# Patient Record
Sex: FEMALE | Race: WHITE | NOT HISPANIC OR LATINO | Employment: FULL TIME | URBAN - METROPOLITAN AREA
[De-identification: names, ages, dates, MRNs, and addresses within clinical notes are randomized per-mention and may not be internally consistent; named-entity substitution may affect disease eponyms.]

---

## 2023-02-11 PROBLEM — D75.839 THROMBOCYTOSIS: Status: ACTIVE | Noted: 2023-02-11

## 2023-02-11 PROBLEM — F32.4 MAJOR DEPRESSIVE DISORDER IN PARTIAL REMISSION (CMS-HCC): Status: ACTIVE | Noted: 2023-02-11

## 2023-02-11 PROBLEM — M85.80 OSTEOPENIA DETERMINED BY X-RAY: Status: ACTIVE | Noted: 2023-02-11

## 2023-02-11 PROBLEM — M24.9 HYPERMOBILITY OF JOINT: Status: ACTIVE | Noted: 2023-02-11

## 2023-02-11 PROBLEM — M25.569 KNEE PAIN, ACUTE: Status: ACTIVE | Noted: 2023-02-11

## 2023-02-11 PROBLEM — F98.8 ATTENTION DEFICIT DISORDER OF ADULT: Status: ACTIVE | Noted: 2023-02-11

## 2023-02-11 PROBLEM — N92.6 MENSTRUAL PROBLEM: Status: ACTIVE | Noted: 2023-02-11

## 2023-02-11 PROBLEM — R63.5 WEIGHT GAIN: Status: ACTIVE | Noted: 2023-02-11

## 2023-02-11 PROBLEM — R60.0 BILATERAL LEG EDEMA: Status: ACTIVE | Noted: 2023-02-11

## 2023-02-11 PROBLEM — R79.89 LOW SERUM VITAMIN D: Status: ACTIVE | Noted: 2023-02-11

## 2023-02-11 PROBLEM — R03.0 ELEVATED BLOOD PRESSURE READING WITHOUT DIAGNOSIS OF HYPERTENSION: Status: ACTIVE | Noted: 2023-02-11

## 2023-02-11 RX ORDER — ESCITALOPRAM OXALATE 20 MG/1
1 TABLET ORAL DAILY
COMMUNITY
Start: 2019-12-26 | End: 2023-05-05

## 2023-02-11 RX ORDER — DEXTROAMPHETAMINE SACCHARATE, AMPHETAMINE ASPARTATE MONOHYDRATE, DEXTROAMPHETAMINE SULFATE AND AMPHETAMINE SULFATE 2.5; 2.5; 2.5; 2.5 MG/1; MG/1; MG/1; MG/1
10 CAPSULE, EXTENDED RELEASE ORAL EVERY MORNING
COMMUNITY

## 2023-02-11 RX ORDER — NORETHINDRONE ACETATE AND ETHINYL ESTRADIOL 1MG-20(21)
1 KIT ORAL DAILY
COMMUNITY
Start: 2021-06-07

## 2023-02-11 RX ORDER — ERGOCALCIFEROL 1.25 MG/1
50000 CAPSULE ORAL
COMMUNITY
Start: 2022-12-05 | End: 2023-05-05 | Stop reason: SDUPTHER

## 2023-02-11 RX ORDER — HYDROCHLOROTHIAZIDE 12.5 MG/1
12.5 CAPSULE ORAL DAILY PRN
COMMUNITY
Start: 2021-04-30 | End: 2023-05-05 | Stop reason: SDUPTHER

## 2023-03-09 ENCOUNTER — OFFICE VISIT (OUTPATIENT)
Dept: PRIMARY CARE | Facility: CLINIC | Age: 28
End: 2023-03-09
Payer: COMMERCIAL

## 2023-03-09 VITALS
SYSTOLIC BLOOD PRESSURE: 142 MMHG | DIASTOLIC BLOOD PRESSURE: 92 MMHG | WEIGHT: 180 LBS | HEIGHT: 64 IN | BODY MASS INDEX: 30.73 KG/M2

## 2023-03-09 DIAGNOSIS — F98.8 ATTENTION DEFICIT DISORDER OF ADULT: Primary | ICD-10-CM

## 2023-03-09 PROCEDURE — 99213 OFFICE O/P EST LOW 20 MIN: CPT | Performed by: INTERNAL MEDICINE

## 2023-03-09 NOTE — PROGRESS NOTES
"Subjective   Nazanin Junior is a 27 y.o. female who presents for Hypertension.  [unfilled]  She is here for follow up on adderall  She started it two weeks ago (02/23/23)   Feeling better focusing  She is not having insomnia  She wishes to remain on this med at the current dose      Review of Systems  No chest pain  No palpitations  No headaches    Objective   BP (!) 142/92   Ht 1.626 m (5' 4\")   Wt 81.6 kg (180 lb)   BMI 30.90 kg/m²    Physical Exam  Cardiovascular:      Rate and Rhythm: Normal rate and regular rhythm.      Heart sounds: Normal heart sounds.   Pulmonary:      Effort: Pulmonary effort is normal.      Breath sounds: Normal breath sounds.   Abdominal:      Palpations: Abdomen is soft.   Neurological:      Mental Status: She is alert.   Psychiatric:         Mood and Affect: Mood normal.         Thought Content: Thought content normal.         Judgment: Judgment normal.         Assessment/Plan   Problem List Items Addressed This Visit    None         "

## 2023-03-09 NOTE — PATIENT INSTRUCTIONS
See me again in 3 mo.  Please find out from your insurance  if you can get a 90 day supply of the adderall.

## 2023-03-16 PROBLEM — F41.8 DEPRESSION WITH ANXIETY: Status: ACTIVE | Noted: 2023-03-16

## 2023-04-25 ENCOUNTER — TELEPHONE (OUTPATIENT)
Dept: PRIMARY CARE | Facility: CLINIC | Age: 28
End: 2023-04-25
Payer: COMMERCIAL

## 2023-04-28 DIAGNOSIS — F41.8 OTHER SPECIFIED ANXIETY DISORDERS: ICD-10-CM

## 2023-04-28 DIAGNOSIS — R03.0 ELEVATED BLOOD PRESSURE READING IN OFFICE WITHOUT DIAGNOSIS OF HYPERTENSION: Primary | ICD-10-CM

## 2023-04-28 DIAGNOSIS — R79.89 LOW SERUM VITAMIN D: ICD-10-CM

## 2023-05-05 RX ORDER — ERGOCALCIFEROL 1.25 MG/1
50000 CAPSULE ORAL
Qty: 4 CAPSULE | Refills: 5 | Status: SHIPPED | OUTPATIENT
Start: 2023-05-05 | End: 2023-06-04

## 2023-05-05 RX ORDER — ESCITALOPRAM OXALATE 20 MG/1
TABLET ORAL
Qty: 90 TABLET | Refills: 0 | Status: SHIPPED | OUTPATIENT
Start: 2023-05-05 | End: 2024-02-26

## 2023-05-05 RX ORDER — HYDROCHLOROTHIAZIDE 12.5 MG/1
12.5 CAPSULE ORAL DAILY PRN
Qty: 90 CAPSULE | Refills: 0 | Status: SHIPPED | OUTPATIENT
Start: 2023-05-05 | End: 2023-08-02

## 2023-06-08 ENCOUNTER — APPOINTMENT (OUTPATIENT)
Dept: PRIMARY CARE | Facility: CLINIC | Age: 28
End: 2023-06-08
Payer: COMMERCIAL

## 2023-06-22 ENCOUNTER — APPOINTMENT (OUTPATIENT)
Dept: PRIMARY CARE | Facility: CLINIC | Age: 28
End: 2023-06-22
Payer: COMMERCIAL

## 2023-07-14 LAB
ALANINE AMINOTRANSFERASE (SGPT) (U/L) IN SER/PLAS: 15 U/L (ref 7–45)
ALBUMIN (G/DL) IN SER/PLAS: 4.2 G/DL (ref 3.4–5)
ALKALINE PHOSPHATASE (U/L) IN SER/PLAS: 65 U/L (ref 33–110)
ANION GAP IN SER/PLAS: 12 MMOL/L (ref 10–20)
APPEARANCE, URINE: ABNORMAL
ASPARTATE AMINOTRANSFERASE (SGOT) (U/L) IN SER/PLAS: 14 U/L (ref 9–39)
BACTERIA, URINE: ABNORMAL /HPF
BASOPHILS (10*3/UL) IN BLOOD BY AUTOMATED COUNT: 0.08 X10E9/L (ref 0–0.1)
BASOPHILS/100 LEUKOCYTES IN BLOOD BY AUTOMATED COUNT: 1 % (ref 0–2)
BILIRUBIN TOTAL (MG/DL) IN SER/PLAS: 0.3 MG/DL (ref 0–1.2)
BILIRUBIN, URINE: NEGATIVE
BLOOD, URINE: NEGATIVE
CALCIDIOL (25 OH VITAMIN D3) (NG/ML) IN SER/PLAS: 56 NG/ML
CALCIUM (MG/DL) IN SER/PLAS: 9.7 MG/DL (ref 8.6–10.3)
CALCIUM OXALATE CRYSTALS, URINE: ABNORMAL /HPF
CARBON DIOXIDE, TOTAL (MMOL/L) IN SER/PLAS: 27 MMOL/L (ref 21–32)
CHLORIDE (MMOL/L) IN SER/PLAS: 104 MMOL/L (ref 98–107)
CHOLESTEROL (MG/DL) IN SER/PLAS: 207 MG/DL (ref 0–199)
CHOLESTEROL IN HDL (MG/DL) IN SER/PLAS: 61.7 MG/DL
CHOLESTEROL/HDL RATIO: 3.4
COBALAMIN (VITAMIN B12) (PG/ML) IN SER/PLAS: 182 PG/ML (ref 211–911)
COLOR, URINE: YELLOW
CREATININE (MG/DL) IN SER/PLAS: 0.82 MG/DL (ref 0.5–1.05)
EOSINOPHILS (10*3/UL) IN BLOOD BY AUTOMATED COUNT: 0.3 X10E9/L (ref 0–0.7)
EOSINOPHILS/100 LEUKOCYTES IN BLOOD BY AUTOMATED COUNT: 3.9 % (ref 0–6)
ERYTHROCYTE DISTRIBUTION WIDTH (RATIO) BY AUTOMATED COUNT: 13 % (ref 11.5–14.5)
ERYTHROCYTE MEAN CORPUSCULAR HEMOGLOBIN CONCENTRATION (G/DL) BY AUTOMATED: 33.2 G/DL (ref 32–36)
ERYTHROCYTE MEAN CORPUSCULAR VOLUME (FL) BY AUTOMATED COUNT: 91 FL (ref 80–100)
ERYTHROCYTES (10*6/UL) IN BLOOD BY AUTOMATED COUNT: 4.72 X10E12/L (ref 4–5.2)
GFR FEMALE: >90 ML/MIN/1.73M2
GLUCOSE (MG/DL) IN SER/PLAS: 79 MG/DL (ref 74–99)
GLUCOSE, URINE: NEGATIVE MG/DL
HEMATOCRIT (%) IN BLOOD BY AUTOMATED COUNT: 43.1 % (ref 36–46)
HEMOGLOBIN (G/DL) IN BLOOD: 14.3 G/DL (ref 12–16)
IMMATURE GRANULOCYTES/100 LEUKOCYTES IN BLOOD BY AUTOMATED COUNT: 0.3 % (ref 0–0.9)
IRON (UG/DL) IN SER/PLAS: 96 UG/DL (ref 35–150)
IRON BINDING CAPACITY (UG/DL) IN SER/PLAS: 395 UG/DL (ref 240–445)
IRON SATURATION (%) IN SER/PLAS: 24 % (ref 25–45)
KETONES, URINE: NEGATIVE MG/DL
LDL: 129 MG/DL (ref 0–99)
LEUKOCYTE ESTERASE, URINE: ABNORMAL
LEUKOCYTES (10*3/UL) IN BLOOD BY AUTOMATED COUNT: 7.7 X10E9/L (ref 4.4–11.3)
LYMPHOCYTES (10*3/UL) IN BLOOD BY AUTOMATED COUNT: 3.12 X10E9/L (ref 1.2–4.8)
LYMPHOCYTES/100 LEUKOCYTES IN BLOOD BY AUTOMATED COUNT: 40.5 % (ref 13–44)
MONOCYTES (10*3/UL) IN BLOOD BY AUTOMATED COUNT: 0.66 X10E9/L (ref 0.1–1)
MONOCYTES/100 LEUKOCYTES IN BLOOD BY AUTOMATED COUNT: 8.6 % (ref 2–10)
MUCUS, URINE: ABNORMAL /LPF
NEUTROPHILS (10*3/UL) IN BLOOD BY AUTOMATED COUNT: 3.53 X10E9/L (ref 1.2–7.7)
NEUTROPHILS/100 LEUKOCYTES IN BLOOD BY AUTOMATED COUNT: 45.7 % (ref 40–80)
NITRITE, URINE: NEGATIVE
PH, URINE: 7 (ref 5–8)
PLATELETS (10*3/UL) IN BLOOD AUTOMATED COUNT: 518 X10E9/L (ref 150–450)
POTASSIUM (MMOL/L) IN SER/PLAS: 4.4 MMOL/L (ref 3.5–5.3)
PROTEIN TOTAL: 7 G/DL (ref 6.4–8.2)
PROTEIN, URINE: NEGATIVE MG/DL
RBC, URINE: 1 /HPF (ref 0–5)
SODIUM (MMOL/L) IN SER/PLAS: 139 MMOL/L (ref 136–145)
SPECIFIC GRAVITY, URINE: 1.02 (ref 1–1.03)
SQUAMOUS EPITHELIAL CELLS, URINE: 1 /HPF
THYROTROPIN (MIU/L) IN SER/PLAS BY DETECTION LIMIT <= 0.05 MIU/L: 1.68 MIU/L (ref 0.44–3.98)
TRIGLYCERIDE (MG/DL) IN SER/PLAS: 84 MG/DL (ref 0–149)
UREA NITROGEN (MG/DL) IN SER/PLAS: 9 MG/DL (ref 6–23)
UROBILINOGEN, URINE: <2 MG/DL (ref 0–1.9)
VLDL: 17 MG/DL (ref 0–40)
WBC CLUMPS, URINE: ABNORMAL /HPF
WBC, URINE: 7 /HPF (ref 0–5)

## 2023-07-30 DIAGNOSIS — R03.0 ELEVATED BLOOD PRESSURE READING IN OFFICE WITHOUT DIAGNOSIS OF HYPERTENSION: ICD-10-CM

## 2023-08-02 RX ORDER — HYDROCHLOROTHIAZIDE 12.5 MG/1
12.5 CAPSULE ORAL DAILY PRN
Qty: 90 CAPSULE | Refills: 0 | Status: SHIPPED | OUTPATIENT
Start: 2023-08-02 | End: 2024-02-05

## 2023-11-09 NOTE — PROGRESS NOTES
Teresa, I sent you one message. Please don't call her with it. I decided I will send her a My Chart note instead.

## 2023-11-20 DIAGNOSIS — E53.8 VITAMIN B12 DEFICIENCY: ICD-10-CM

## 2023-11-20 DIAGNOSIS — M85.80 OSTEOPENIA DETERMINED BY X-RAY: Primary | ICD-10-CM

## 2023-11-20 DIAGNOSIS — R79.89 LOW SERUM VITAMIN D: ICD-10-CM

## 2023-11-20 DIAGNOSIS — E53.8 FOLATE DEFICIENCY: ICD-10-CM

## 2023-11-20 RX ORDER — ERGOCALCIFEROL 1.25 MG/1
50000 CAPSULE ORAL
Qty: 12 CAPSULE | Refills: 1 | Status: SHIPPED | OUTPATIENT
Start: 2023-11-20 | End: 2024-04-17 | Stop reason: SDUPTHER

## 2023-11-20 RX ORDER — ERGOCALCIFEROL 1.25 MG/1
50000 CAPSULE ORAL
COMMUNITY
End: 2023-11-20 | Stop reason: SDUPTHER

## 2023-11-30 ENCOUNTER — LAB (OUTPATIENT)
Dept: LAB | Facility: LAB | Age: 28
End: 2023-11-30
Payer: COMMERCIAL

## 2023-11-30 DIAGNOSIS — E53.8 VITAMIN B12 DEFICIENCY: ICD-10-CM

## 2023-11-30 DIAGNOSIS — M85.80 OSTEOPENIA DETERMINED BY X-RAY: ICD-10-CM

## 2023-11-30 DIAGNOSIS — R79.89 LOW SERUM VITAMIN D: ICD-10-CM

## 2023-11-30 DIAGNOSIS — E53.8 FOLATE DEFICIENCY: ICD-10-CM

## 2023-11-30 PROCEDURE — 82746 ASSAY OF FOLIC ACID SERUM: CPT

## 2023-11-30 PROCEDURE — 82306 VITAMIN D 25 HYDROXY: CPT

## 2023-11-30 PROCEDURE — 82607 VITAMIN B-12: CPT

## 2023-11-30 PROCEDURE — 36415 COLL VENOUS BLD VENIPUNCTURE: CPT

## 2023-12-01 LAB
25(OH)D3 SERPL-MCNC: 80 NG/ML (ref 30–100)
FOLATE SERPL-MCNC: 23.1 NG/ML
VIT B12 SERPL-MCNC: 253 PG/ML (ref 211–911)

## 2023-12-22 ENCOUNTER — ANCILLARY PROCEDURE (OUTPATIENT)
Dept: RADIOLOGY | Facility: CLINIC | Age: 28
End: 2023-12-22
Payer: COMMERCIAL

## 2023-12-22 DIAGNOSIS — M85.80 OTHER SPECIFIED DISORDERS OF BONE DENSITY AND STRUCTURE, UNSPECIFIED SITE: ICD-10-CM

## 2023-12-22 PROCEDURE — 77080 DXA BONE DENSITY AXIAL: CPT

## 2023-12-22 PROCEDURE — 77080 DXA BONE DENSITY AXIAL: CPT | Performed by: RADIOLOGY

## 2023-12-27 ENCOUNTER — TELEPHONE (OUTPATIENT)
Dept: RHEUMATOLOGY | Facility: CLINIC | Age: 28
End: 2023-12-27
Payer: COMMERCIAL

## 2023-12-27 NOTE — TELEPHONE ENCOUNTER
----- Message from Fabby Marie DO sent at 12/26/2023 12:53 PM EST -----  I just received her bone density, please see if I saw her this year, if not she need a follow up appointment  ----- Message -----  From: Interface, Radiology Results In  Sent: 12/22/2023   6:16 PM EST  To: Fabby Marie DO

## 2024-02-05 ENCOUNTER — LAB (OUTPATIENT)
Dept: LAB | Facility: LAB | Age: 29
End: 2024-02-05
Payer: COMMERCIAL

## 2024-02-05 ENCOUNTER — OFFICE VISIT (OUTPATIENT)
Dept: RHEUMATOLOGY | Facility: CLINIC | Age: 29
End: 2024-02-05
Payer: COMMERCIAL

## 2024-02-05 VITALS
HEART RATE: 82 BPM | WEIGHT: 207 LBS | SYSTOLIC BLOOD PRESSURE: 126 MMHG | DIASTOLIC BLOOD PRESSURE: 88 MMHG | HEIGHT: 64 IN | BODY MASS INDEX: 35.34 KG/M2

## 2024-02-05 DIAGNOSIS — M85.80 LOW BONE MASS: ICD-10-CM

## 2024-02-05 DIAGNOSIS — M85.80 LOW BONE MASS: Primary | ICD-10-CM

## 2024-02-05 PROCEDURE — 99214 OFFICE O/P EST MOD 30 MIN: CPT | Performed by: INTERNAL MEDICINE

## 2024-02-05 PROCEDURE — 36415 COLL VENOUS BLD VENIPUNCTURE: CPT

## 2024-02-05 PROCEDURE — 82565 ASSAY OF CREATININE: CPT

## 2024-02-05 RX ORDER — ALENDRONATE SODIUM 70 MG/1
70 TABLET ORAL
Qty: 4 TABLET | Refills: 1 | Status: SHIPPED | OUTPATIENT
Start: 2024-02-05 | End: 2024-04-29 | Stop reason: SDUPTHER

## 2024-02-05 NOTE — PROGRESS NOTES
Follow-up Rheumatology Patient Visit    Chief Complaint:  Nazanin Junior is a 28 y.o. female presenting today for Follow-up.    History of Presenting Problem:   29 y/o female with hx of osteopenia present for evaluation. She denies any history of fragility fracture. Grandmother with history of osteoporosis  She reports history of progressively her joints  She denies use of medications to accelerate bone loss  She has regular menstrual periods   Patient had a repeat bone density and she is here to go over the results and possible treatment    Problem List:   Patient Active Problem List   Diagnosis    Attention deficit disorder of adult    Bilateral leg edema    Low serum vitamin D    Menstrual problem    Osteopenia determined by x-ray    Thrombocytosis    Weight gain    Knee pain, acute    Elevated blood pressure reading without diagnosis of hypertension    Hypermobility of joint    Major depressive disorder in partial remission (CMS/HCC)    Depression with anxiety       Past Medical History: No past medical history on file.    Surgical History: No past surgical history on file.     Allergies: No Known Allergies    Medications:   Current Outpatient Medications:     ergocalciferol (Vitamin D-2) 1.25 MG (39511 UT) capsule, Take 1 capsule (50,000 Units) by mouth 1 (one) time per week. (Patient taking differently: Take 5,000 Units by mouth 1 (one) time per week.), Disp: 12 capsule, Rfl: 1    escitalopram (Lexapro) 20 mg tablet, TAKE 1 TABLET BY MOUTH EVERY DAY, Disp: 90 tablet, Rfl: 0    hydroCHLOROthiazide (Microzide) 12.5 mg capsule, TAKE 1 CAPSULE (12.5 MG) BY MOUTH ONCE DAILY AS NEEDED (SWELLING)., Disp: 90 capsule, Rfl: 0    norethindrone-e.estradioL-iron (Junel FE 1/20) 1 mg-20 mcg (21)/75 mg (7) tablet, Take 1 tablet by mouth once daily., Disp: , Rfl:     alendronate (Fosamax) 70 mg tablet, Take 1 tablet (70 mg) by mouth every 7 days. Take in morning with full glass of water on an empty stomach. No food, drink,  "or meds for 30 minutes after. Do not lie down until after first meal of day (to avoid throat irritation)., Disp: 4 tablet, Rfl: 1    amphetamine-dextroamphetamine XR (Adderall XR) 10 mg 24 hr capsule, Take 1 capsule (10 mg) by mouth once daily in the morning. Do not crush or chew., Disp: , Rfl:       Objective   Physical Examination:    Visit Vitals  /88   Pulse 82   Ht 1.626 m (5' 4\")   Wt 93.9 kg (207 lb)   BMI 35.53 kg/m²   Smoking Status Never   BSA 2.06 m²        Gen: NAD, A&O x 3  HEENT: clear sclera and conjunctiva,     Musculoskeletal:   Neck; WNL, full ROM  Shoulder: WNL, full ROM  Elbow:WNL, full ROM, no effusion noted  Wrist and fingers;no active synovitis noted, Full ROM in the Wrist , Good fist and   Knees:  No effusions or crepitation, full ROM.  Hips; WNL, full ROM, Negative Juan José test  Ankle, Feet; WNL, full ROM    Skin: No rashes or lesions seen, no nail changes  Neuro: A&O x3, Normal Gait    Procedures :None    Orders:  Orders Placed This Encounter   Procedures    Creatinine        Provider Impression:   Assessment/Plan   Encounter Diagnosis   Name Primary?    Low bone mass Yes        26 y/o female with hx of osteopenia present for evaluation. However score is noted to be -1.6 in the left lateral neck. She has not had any known fractures. Her PCP did order a work-up for secondary causes of osteoporosis and are negative.  Repeat bone density shows that her T-score has worsened in the left Hip at -1.7  -After discussing options recommended patient started on bisphosphonate with her bone loss.  Start alendronate 70 mg weekly  -The patient should maintain intake of supplementation of calcium 1200 mg in divided doses and vitamin D 2000 units,  -Avoid activity that puts her at risk for fracture  -Engagement in regular weight bearing and muscle strengthening exercises as tolerated.   -Excessive caffeine use and alcohol use should be avoided.  -Patient should have a repeat BMD testing in Dec " 2025   -Follow-up in 2 months

## 2024-02-06 LAB
CREAT SERPL-MCNC: 0.84 MG/DL (ref 0.5–1.05)
EGFRCR SERPLBLD CKD-EPI 2021: >90 ML/MIN/1.73M*2

## 2024-02-13 ENCOUNTER — TELEPHONE (OUTPATIENT)
Dept: RHEUMATOLOGY | Facility: CLINIC | Age: 29
End: 2024-02-13
Payer: COMMERCIAL

## 2024-02-24 DIAGNOSIS — F41.8 OTHER SPECIFIED ANXIETY DISORDERS: ICD-10-CM

## 2024-02-26 RX ORDER — ESCITALOPRAM OXALATE 20 MG/1
TABLET ORAL
Qty: 90 TABLET | Refills: 0 | Status: SHIPPED | OUTPATIENT
Start: 2024-02-26

## 2024-03-27 ENCOUNTER — APPOINTMENT (OUTPATIENT)
Dept: OTOLARYNGOLOGY | Facility: CLINIC | Age: 29
End: 2024-03-27
Payer: COMMERCIAL

## 2024-04-01 DIAGNOSIS — M85.80 LOW BONE MASS: ICD-10-CM

## 2024-04-03 ENCOUNTER — DOCUMENTATION (OUTPATIENT)
Dept: RHEUMATOLOGY | Facility: CLINIC | Age: 29
End: 2024-04-03
Payer: COMMERCIAL

## 2024-04-03 NOTE — PROGRESS NOTES
Tried to contact patient by phone to schedule 2 month follow up for April, number does not seem to be a working number. Also tried to send patient a message via [employer] for her to call and schedule. This looks like the 2nd attempt that we have tried to reach out to patient to schedule.

## 2024-04-04 RX ORDER — ALENDRONATE SODIUM 70 MG/1
70 TABLET ORAL
Qty: 4 TABLET | Refills: 1 | OUTPATIENT
Start: 2024-04-04 | End: 2024-06-03

## 2024-04-17 DIAGNOSIS — R79.89 LOW SERUM VITAMIN D: ICD-10-CM

## 2024-04-17 DIAGNOSIS — M85.80 OSTEOPENIA DETERMINED BY X-RAY: ICD-10-CM

## 2024-04-17 RX ORDER — ERGOCALCIFEROL 1.25 MG/1
50000 CAPSULE ORAL
Qty: 12 CAPSULE | Refills: 1 | Status: SHIPPED | OUTPATIENT
Start: 2024-04-21

## 2024-04-29 ENCOUNTER — OFFICE VISIT (OUTPATIENT)
Dept: RHEUMATOLOGY | Facility: CLINIC | Age: 29
End: 2024-04-29
Payer: COMMERCIAL

## 2024-04-29 ENCOUNTER — LAB (OUTPATIENT)
Dept: LAB | Facility: LAB | Age: 29
End: 2024-04-29
Payer: COMMERCIAL

## 2024-04-29 ENCOUNTER — APPOINTMENT (OUTPATIENT)
Dept: RHEUMATOLOGY | Facility: CLINIC | Age: 29
End: 2024-04-29
Payer: COMMERCIAL

## 2024-04-29 VITALS
BODY MASS INDEX: 35.19 KG/M2 | HEART RATE: 96 BPM | WEIGHT: 205 LBS | DIASTOLIC BLOOD PRESSURE: 90 MMHG | SYSTOLIC BLOOD PRESSURE: 141 MMHG

## 2024-04-29 DIAGNOSIS — M85.80 LOW BONE MASS: ICD-10-CM

## 2024-04-29 PROCEDURE — 80048 BASIC METABOLIC PNL TOTAL CA: CPT

## 2024-04-29 PROCEDURE — 36415 COLL VENOUS BLD VENIPUNCTURE: CPT

## 2024-04-29 PROCEDURE — 99213 OFFICE O/P EST LOW 20 MIN: CPT | Performed by: INTERNAL MEDICINE

## 2024-04-29 RX ORDER — ALENDRONATE SODIUM 70 MG/1
70 TABLET ORAL
Qty: 12 TABLET | Refills: 2 | Status: SHIPPED | OUTPATIENT
Start: 2024-04-29 | End: 2024-07-28

## 2024-04-29 NOTE — PROGRESS NOTES
Follow-up Rheumatology Patient Visit    Chief Complaint:  Nazanin Junior is a 28 y.o. female presenting today for Med Management.    History of Presenting Problem:   29 y/o female with hx of osteopenia present for evaluation. She denies any history of fragility fracture. Grandmother with history of osteoporosis  She reports history of progressively her joints  She denies use of medications to accelerate bone loss  She has regular menstrual periods   Today patient reports she is tolerating Fosamax, the first 2 weeks she had a lot of achiness but that dissipated.    Problem List:   Patient Active Problem List   Diagnosis    Attention deficit disorder of adult    Bilateral leg edema    Low serum vitamin D    Menstrual problem    Osteopenia determined by x-ray    Thrombocytosis    Weight gain    Knee pain, acute    Elevated blood pressure reading without diagnosis of hypertension    Hypermobility of joint    Major depressive disorder in partial remission (CMS-HCC)    Depression with anxiety       Past Medical History: No past medical history on file.    Surgical History: No past surgical history on file.     Allergies: No Known Allergies    Medications:   Current Outpatient Medications:     amphetamine-dextroamphetamine XR (Adderall XR) 10 mg 24 hr capsule, Take 1 capsule (10 mg) by mouth once daily in the morning. Do not crush or chew., Disp: , Rfl:     ergocalciferol (Vitamin D-2) 1.25 MG (52127 UT) capsule, Take 1 capsule (50,000 Units) by mouth 1 (one) time per week., Disp: 12 capsule, Rfl: 1    escitalopram (Lexapro) 20 mg tablet, TAKE 1 TABLET BY MOUTH EVERY DAY, Disp: 90 tablet, Rfl: 0    norethindrone-e.estradioL-iron (Junel FE 1/20) 1 mg-20 mcg (21)/75 mg (7) tablet, Take 1 tablet by mouth once daily., Disp: , Rfl:     alendronate (Fosamax) 70 mg tablet, Take 1 tablet (70 mg) by mouth every 7 days. Take in morning with full glass of water on an empty stomach. No food, drink, or meds for 30 minutes after. Do  not lie down until after first meal of day (to avoid throat irritation)., Disp: 12 tablet, Rfl: 2    hydroCHLOROthiazide (Microzide) 12.5 mg capsule, TAKE 1 CAPSULE (12.5 MG) BY MOUTH ONCE DAILY AS NEEDED (SWELLING)., Disp: 90 capsule, Rfl: 0      Objective   Physical Examination:    Visit Vitals  /90   Pulse 96   Wt 93 kg (205 lb)   BMI 35.19 kg/m²   Smoking Status Never   BSA 2.05 m²        Gen: NAD, A&O x 3  HEENT: clear sclera and conjunctiva,     Musculoskeletal:   Neck; WNL, full ROM  Shoulder: WNL, full ROM  Elbow:WNL, full ROM, no effusion noted  Wrist and fingers;no active synovitis noted, Full ROM in the Wrist , Good fist and   Knees:  No effusions or crepitation, full ROM.  Hips; WNL, full ROM, Negative Juan José test  Ankle, Feet; WNL, full ROM    Skin: No rashes or lesions seen, no nail changes  Neuro: A&O x3, Normal Gait    Procedures :None    Orders:  Orders Placed This Encounter   Procedures    Basic Metabolic Panel        Provider Impression:   Assessment/Plan   Encounter Diagnosis   Name Primary?    Low bone mass           29 y/o female with hx of osteopenia present for evaluation. However score is noted to be -1.6 in the left lateral neck. She has not had any known fractures. Her PCP did order a work-up for secondary causes of osteoporosis and are negative.  Repeat bone density shows that her T-score has worsened in the left Hip at -1.7  -Continue with alendronate 70 mg weekly  -The patient should maintain intake of supplementation of calcium 1200 mg in divided doses and vitamin D 2000 units,  -Avoid activity that puts her at risk for fracture  -Engagement in regular weight bearing and muscle strengthening exercises as tolerated.   -Excessive caffeine use and alcohol use should be avoided.  -Patient should have a repeat BMD testing in Dec 2025   -Follow-up in 9 months

## 2024-04-30 LAB
ANION GAP SERPL CALC-SCNC: 16 MMOL/L (ref 10–20)
BUN SERPL-MCNC: 9 MG/DL (ref 6–23)
CALCIUM SERPL-MCNC: 9.4 MG/DL (ref 8.6–10.6)
CHLORIDE SERPL-SCNC: 104 MMOL/L (ref 98–107)
CO2 SERPL-SCNC: 23 MMOL/L (ref 21–32)
CREAT SERPL-MCNC: 0.68 MG/DL (ref 0.5–1.05)
EGFRCR SERPLBLD CKD-EPI 2021: >90 ML/MIN/1.73M*2
GLUCOSE SERPL-MCNC: 96 MG/DL (ref 74–99)
POTASSIUM SERPL-SCNC: 4.5 MMOL/L (ref 3.5–5.3)
SODIUM SERPL-SCNC: 138 MMOL/L (ref 136–145)

## 2024-07-10 DIAGNOSIS — N92.6 MENSTRUAL PROBLEM: ICD-10-CM

## 2024-07-10 RX ORDER — NORETHINDRONE ACETATE AND ETHINYL ESTRADIOL 1MG-20(21)
1 KIT ORAL DAILY
Qty: 28 TABLET | Refills: 12 | Status: SHIPPED | OUTPATIENT
Start: 2024-07-10

## 2024-07-31 ENCOUNTER — APPOINTMENT (OUTPATIENT)
Dept: OTOLARYNGOLOGY | Facility: CLINIC | Age: 29
End: 2024-07-31
Payer: COMMERCIAL

## 2024-07-31 ENCOUNTER — APPOINTMENT (OUTPATIENT)
Dept: AUDIOLOGY | Facility: CLINIC | Age: 29
End: 2024-07-31
Payer: COMMERCIAL

## 2024-07-31 VITALS — BODY MASS INDEX: 35 KG/M2 | WEIGHT: 205 LBS | HEIGHT: 64 IN

## 2024-07-31 DIAGNOSIS — H61.23 BILATERAL IMPACTED CERUMEN: ICD-10-CM

## 2024-07-31 DIAGNOSIS — H90.0 CONDUCTIVE HEARING LOSS, BILATERAL: Primary | ICD-10-CM

## 2024-07-31 PROBLEM — R93.7 X-RAY EVIDENCE OF POOR BONE MINERALIZATION: Status: ACTIVE | Noted: 2024-07-31

## 2024-07-31 PROBLEM — N63.20 LEFT BREAST LUMP: Status: ACTIVE | Noted: 2019-12-16

## 2024-07-31 PROBLEM — M85.9 LOW BONE DENSITY: Status: ACTIVE | Noted: 2022-10-31

## 2024-07-31 PROCEDURE — 1036F TOBACCO NON-USER: CPT | Performed by: STUDENT IN AN ORGANIZED HEALTH CARE EDUCATION/TRAINING PROGRAM

## 2024-07-31 PROCEDURE — 3008F BODY MASS INDEX DOCD: CPT | Performed by: STUDENT IN AN ORGANIZED HEALTH CARE EDUCATION/TRAINING PROGRAM

## 2024-07-31 PROCEDURE — 99203 OFFICE O/P NEW LOW 30 MIN: CPT | Performed by: STUDENT IN AN ORGANIZED HEALTH CARE EDUCATION/TRAINING PROGRAM

## 2024-07-31 PROCEDURE — 69210 REMOVE IMPACTED EAR WAX UNI: CPT | Performed by: STUDENT IN AN ORGANIZED HEALTH CARE EDUCATION/TRAINING PROGRAM

## 2024-07-31 NOTE — PROGRESS NOTES
OTOSCOPY      Name:  Nazanin Junior  :  1995  Age:  29 y.o.  Date of Evaluation:  24   Referring Provider:  Dr. Burkett     History:  Ms. Junior was to be seen for audiometric evaluation due to aural fullness and muffled hearing, however patient had concerns for cerumen accumulation    See audiometric evaluation at end of this report or scanned under media tab    OTOSCOPY:       Right Ear: Occluding cerumen       Left Ear: Occluding cerumen    NOTE: Patient walked to ENT for cerumen to be removed. Following cerumen removal, patient noted significant improvement in hearing sensitivity and declined audiometric evaluation.      RECOMMENDATIONS:  -Recommend patient return should concerns for changes in hearing sensitivity arise or as medically indicated.     Klaus Rod, CCC-A     Appt: 1:00 - 1:05 PM

## 2024-07-31 NOTE — PROGRESS NOTES
HPI  Nazanin Junior is a 29 y.o. female presenting for initial evaluation of bilateral cerumen impaction.  The patient reports developing bilateral cerumen buildup for several months affecting her hearing.  Denies ear pain, vertigo, discharge from ear, tinnitus, aural fullness or autophony.   Denies history of prior ear surgery.     History reviewed. No pertinent past medical history.    History reviewed. No pertinent surgical history.      Current Outpatient Medications on File Prior to Visit   Medication Sig Dispense Refill    ergocalciferol (Vitamin D-2) 1.25 MG (17362 UT) capsule Take 1 capsule (50,000 Units) by mouth 1 (one) time per week. 12 capsule 1    escitalopram (Lexapro) 20 mg tablet TAKE 1 TABLET BY MOUTH EVERY DAY 90 tablet 0    norethindrone-e.estradioL-iron (Junel FE 1/20) 1 mg-20 mcg (21)/75 mg (7) tablet Take 1 tablet by mouth once daily. 28 tablet 12    alendronate (Fosamax) 70 mg tablet Take 1 tablet (70 mg) by mouth every 7 days. Take in morning with full glass of water on an empty stomach. No food, drink, or meds for 30 minutes after. Do not lie down until after first meal of day (to avoid throat irritation). 12 tablet 2    hydroCHLOROthiazide (Microzide) 12.5 mg capsule TAKE 1 CAPSULE (12.5 MG) BY MOUTH ONCE DAILY AS NEEDED (SWELLING). 90 capsule 0    [DISCONTINUED] amphetamine-dextroamphetamine XR (Adderall XR) 10 mg 24 hr capsule Take 1 capsule (10 mg) by mouth once daily in the morning. Do not crush or chew.       No current facility-administered medications on file prior to visit.        No Known Allergies     Review of Systems  A detailed 12 point ROS was performed and is negative except as noted in the intake form, HPI and/or Past Medical History        Physical Exam   CONSTITUTIONAL: Well developed, well nourished.  VOICE: Normal voice quality  RESPIRATION: Breathing comfortably, no stridor.  CV: No clubbing/cyanosis/edema in hands.  EYES: EOM Intact, sclera normal.  NEURO: Alert and  oriented times 3, Cranial nerves V,VII intact and symmetric bilaterally.  HEAD AND FACE: Symmetric facial features, no masses or lesions, sinuses nontender to palpation.  SALIVARY GLANDS: Parotid and submandibular glands normal bilaterally.  + EARS: Normal external ears  Bilateral EACs with cerumen impaction (removed/see procedure note)  Right EAC patent, tympanic membrane intact  Left EAC patent, tympanic membrane intact   NOSE: External nose midline, anterior rhinoscopy is normal with limited visualization to the anterior aspect of the interior turbinates. No lesions noted.  ORAL CAVITY/OROPHARYNX/LIPS: Normal mucous membranes, normal floor of mouth/tongue/OP, no masses or lesions are noted.  PHARYNGEAL WALLS AND NASOPHARYNX: No masses noted. Mucosa appears clean and moist  NECK/LYMPH: No LAD, no thyroid masses. Trachea palpably midline  SKIN: Neck skin is without injury  PSYCH: Alert and oriented with appropriate mood and affect     Procedure: Removal of impacted cerumen, bilateral  Indication: Cerumen impaction.   The procedure was reviewed and explained.  Verbal consent was obtained.  With the use of the microscope and speculum the right ear was examined, cerumen was cleaned with the use of curettes, irrigation and suction.  Attention was turned to the left ear, with the use of the microscope and speculum the left ear was examined, cerumen was cleaned with the use of curettes, irrigation and suction.  Hearing returned to baseline bilaterally following cerumen removal. The patient tolerated the procedure well.         Assessment  Bilateral cerumen impaction  Bilateral conductive hearing loss    Plan  29-year-old female presenting for evaluation of bilateral cerumen impaction.  Cerumen impaction removed from both EACs.  Preventive measure discussed, hearing returned to baseline following cerumen removal.  RTC PRN

## 2024-08-14 DIAGNOSIS — F41.8 OTHER SPECIFIED ANXIETY DISORDERS: ICD-10-CM

## 2024-08-14 RX ORDER — ESCITALOPRAM OXALATE 20 MG/1
20 TABLET ORAL DAILY
Qty: 90 TABLET | Refills: 0 | OUTPATIENT
Start: 2024-08-14

## 2024-08-14 NOTE — TELEPHONE ENCOUNTER
Please refill    escitalopram (Lexapro) 20 mg tablet   TAKE 1 TABLET BY MOUTH EVERY DAY   [facility] in [place]

## 2024-08-15 ENCOUNTER — PATIENT MESSAGE (OUTPATIENT)
Dept: PRIMARY CARE | Facility: CLINIC | Age: 29
End: 2024-08-15
Payer: COMMERCIAL

## 2024-08-16 ENCOUNTER — TELEPHONE (OUTPATIENT)
Dept: PRIMARY CARE | Facility: CLINIC | Age: 29
End: 2024-08-16
Payer: COMMERCIAL

## 2024-08-16 DIAGNOSIS — F41.8 OTHER SPECIFIED ANXIETY DISORDERS: ICD-10-CM

## 2024-08-16 NOTE — TELEPHONE ENCOUNTER
Patient calling for a refill on her medications but she hasn't been here since 3/2023.     She is scheduled for a physical here on Oct 2.   She says she will need a refill on her anti-depressant before then.     Escitalopram 20 mg Take 1 tablet by mouth every day    [facility] [phone]    Nazanin [phone]

## 2024-08-19 RX ORDER — ESCITALOPRAM OXALATE 20 MG/1
20 TABLET ORAL DAILY
Qty: 90 TABLET | Refills: 0 | Status: SHIPPED | OUTPATIENT
Start: 2024-08-19

## 2024-10-02 ENCOUNTER — APPOINTMENT (OUTPATIENT)
Dept: PRIMARY CARE | Facility: CLINIC | Age: 29
End: 2024-10-02
Payer: COMMERCIAL

## 2024-10-02 VITALS
WEIGHT: 206 LBS | HEIGHT: 64 IN | RESPIRATION RATE: 16 BRPM | DIASTOLIC BLOOD PRESSURE: 84 MMHG | HEART RATE: 80 BPM | SYSTOLIC BLOOD PRESSURE: 120 MMHG | BODY MASS INDEX: 35.17 KG/M2

## 2024-10-02 DIAGNOSIS — M85.80 OSTEOPENIA, UNSPECIFIED LOCATION: ICD-10-CM

## 2024-10-02 DIAGNOSIS — Z51.81 MEDICATION MONITORING ENCOUNTER: Primary | ICD-10-CM

## 2024-10-02 DIAGNOSIS — M85.80 OSTEOPENIA DETERMINED BY X-RAY: ICD-10-CM

## 2024-10-02 DIAGNOSIS — F32.4 MAJOR DEPRESSIVE DISORDER IN PARTIAL REMISSION, UNSPECIFIED WHETHER RECURRENT (CMS-HCC): ICD-10-CM

## 2024-10-02 DIAGNOSIS — R79.89 LOW SERUM VITAMIN D: ICD-10-CM

## 2024-10-02 DIAGNOSIS — E55.9 VITAMIN D DEFICIENCY: ICD-10-CM

## 2024-10-02 DIAGNOSIS — Z00.00 ROUTINE ADULT HEALTH MAINTENANCE: ICD-10-CM

## 2024-10-02 DIAGNOSIS — E78.5 ELEVATED LIPIDS: ICD-10-CM

## 2024-10-02 PROCEDURE — 99395 PREV VISIT EST AGE 18-39: CPT | Performed by: INTERNAL MEDICINE

## 2024-10-02 PROCEDURE — 3008F BODY MASS INDEX DOCD: CPT | Performed by: INTERNAL MEDICINE

## 2024-10-02 RX ORDER — ESCITALOPRAM OXALATE 20 MG/1
20 TABLET ORAL DAILY
Qty: 90 TABLET | Refills: 3 | Status: SHIPPED | OUTPATIENT
Start: 2024-10-02

## 2024-10-02 RX ORDER — ERGOCALCIFEROL 1.25 MG/1
50000 CAPSULE ORAL
Qty: 12 CAPSULE | Refills: 1 | Status: SHIPPED | OUTPATIENT
Start: 2024-10-06

## 2024-10-02 ASSESSMENT — ENCOUNTER SYMPTOMS
PALPITATIONS: 0
HEADACHES: 1
ROS GI COMMENTS: DENIES HEARTBURN
DYSURIA: 0
DIARRHEA: 0
COUGH: 0
BACK PAIN: 0
SHORTNESS OF BREATH: 0
CONSTIPATION: 0

## 2024-10-02 ASSESSMENT — PATIENT HEALTH QUESTIONNAIRE - PHQ9
SUM OF ALL RESPONSES TO PHQ9 QUESTIONS 1 AND 2: 0
1. LITTLE INTEREST OR PLEASURE IN DOING THINGS: NOT AT ALL
2. FEELING DOWN, DEPRESSED OR HOPELESS: NOT AT ALL

## 2024-10-02 ASSESSMENT — PAIN SCALES - GENERAL: PAINLEVEL: 0-NO PAIN

## 2024-10-02 NOTE — PATIENT INSTRUCTIONS
I sent over the escitalopram and vitamin D.     Get blood test after fasting.     See me in one year for annual physical.

## 2024-10-02 NOTE — PROGRESS NOTES
sSubjective   Patient ID: Nazanin Junior is a 29 y.o. female who presents for annual physical     HPI    Her for annual physical   Last here March 2023  She is feeling well and has no new complaint.    Curently she is taking 50,000 vit d weekly  and 1000 units daily   She also takes calcium and vit B12    Remains on escitalopram, prescribed by me  Also remains on Guillermina FE prescribed by me.  Remains on alendronate, prescribed by rheumatology.    She sees Dr Marie from [facility] for osteopenia and some arthritis  She saw her last in April 2024  Will repeat DEXA in 2025 Dec    Review of Systems   Respiratory:  Negative for cough and shortness of breath.    Cardiovascular:  Negative for chest pain and palpitations.   Gastrointestinal:  Negative for constipation and diarrhea.        Denies heartburn   Genitourinary:  Negative for dysuria.   Musculoskeletal:  Negative for back pain.   Neurological:  Positive for headaches.        She has occasional headaches that she attributes to computer screens.          Current Outpatient Medications:     alendronate (Fosamax) 70 mg tablet, Take 1 tablet (70 mg) by mouth every 7 days. Take in morning with full glass of water on an empty stomach. No food, drink, or meds for 30 minutes after. Do not lie down until after first meal of day (to avoid throat irritation)., Disp: 12 tablet, Rfl: 2    ergocalciferol (Vitamin D-2) 1.25 MG (95857 UT) capsule, Take 1 capsule (50,000 Units) by mouth 1 (one) time per week., Disp: 12 capsule, Rfl: 1    escitalopram (Lexapro) 20 mg tablet, Take 1 tablet (20 mg) by mouth once daily., Disp: 90 tablet, Rfl: 0    hydroCHLOROthiazide (Microzide) 12.5 mg capsule, TAKE 1 CAPSULE (12.5 MG) BY MOUTH ONCE DAILY AS NEEDED (SWELLING)., Disp: 90 capsule, Rfl: 0    norethindrone-e.estradioL-iron (Junel FE 1/20) 1 mg-20 mcg (21)/75 mg (7) tablet, Take 1 tablet by mouth once daily., Disp: 28 tablet, Rfl: 12    Objective   BP (!) 133/92   Pulse 80   Resp 16   Ht 1.626  "m (5' 4\")   Wt 93.4 kg (206 lb)   BMI 35.36 kg/m²     Physical Exam  Constitutional:       Appearance: Normal appearance.   HENT:      Mouth/Throat:      Mouth: Mucous membranes are moist.      Pharynx: Oropharynx is clear.   Eyes:      Conjunctiva/sclera: Conjunctivae normal.      Pupils: Pupils are equal, round, and reactive to light.   Cardiovascular:      Rate and Rhythm: Normal rate and regular rhythm.      Heart sounds: Normal heart sounds.   Pulmonary:      Effort: Pulmonary effort is normal.      Breath sounds: Normal breath sounds.   Abdominal:      General: Bowel sounds are normal. There is no distension.      Palpations: Abdomen is soft. There is no mass.      Tenderness: There is no abdominal tenderness.   Lymphadenopathy:      Cervical: No cervical adenopathy.   Skin:     General: Skin is warm and dry.   Neurological:      General: No focal deficit present.         Assessment/Plan   Encounter for preventive healthcare visit and establish patient: Refilled her escitalopram 90 days and 3 refills.  She will be due for her birth control refill in July 2025.  Refilled her vitamin D by 1000 units once weekly for 3 months with 1 refill.  She gets alendronate through rheumatology.  She has orders in the system for labs.       "

## 2024-10-14 ENCOUNTER — APPOINTMENT (OUTPATIENT)
Dept: PRIMARY CARE | Facility: CLINIC | Age: 29
End: 2024-10-14
Payer: COMMERCIAL

## 2024-10-14 VITALS
WEIGHT: 201 LBS | HEIGHT: 65 IN | BODY MASS INDEX: 33.49 KG/M2 | SYSTOLIC BLOOD PRESSURE: 130 MMHG | DIASTOLIC BLOOD PRESSURE: 86 MMHG | HEART RATE: 84 BPM | TEMPERATURE: 97.9 F | OXYGEN SATURATION: 98 %

## 2024-10-14 DIAGNOSIS — R03.0 ELEVATED BLOOD PRESSURE READING: ICD-10-CM

## 2024-10-14 DIAGNOSIS — M85.80 OSTEOPENIA DETERMINED BY X-RAY: ICD-10-CM

## 2024-10-14 DIAGNOSIS — M25.562 CHRONIC PAIN OF BOTH KNEES: ICD-10-CM

## 2024-10-14 DIAGNOSIS — F33.41 RECURRENT MAJOR DEPRESSIVE DISORDER, IN PARTIAL REMISSION (CMS-HCC): ICD-10-CM

## 2024-10-14 DIAGNOSIS — Z76.89 ENCOUNTER TO ESTABLISH CARE: Primary | ICD-10-CM

## 2024-10-14 DIAGNOSIS — Z00.00 HEALTHCARE MAINTENANCE: ICD-10-CM

## 2024-10-14 DIAGNOSIS — M21.969 ANKLE JOINT DEFORMITY, UNSPECIFIED LATERALITY: ICD-10-CM

## 2024-10-14 DIAGNOSIS — R93.7 X-RAY EVIDENCE OF POOR BONE MINERALIZATION: ICD-10-CM

## 2024-10-14 DIAGNOSIS — M25.561 CHRONIC PAIN OF BOTH KNEES: ICD-10-CM

## 2024-10-14 DIAGNOSIS — M24.9 HYPERMOBILITY OF JOINT: ICD-10-CM

## 2024-10-14 DIAGNOSIS — F41.8 DEPRESSION WITH ANXIETY: ICD-10-CM

## 2024-10-14 DIAGNOSIS — G89.29 CHRONIC PAIN OF BOTH KNEES: ICD-10-CM

## 2024-10-14 PROBLEM — M85.9 LOW BONE DENSITY: Status: RESOLVED | Noted: 2022-10-31 | Resolved: 2024-10-14

## 2024-10-14 PROBLEM — M25.569 KNEE PAIN, ACUTE: Status: RESOLVED | Noted: 2023-02-11 | Resolved: 2024-10-14

## 2024-10-14 PROCEDURE — 1036F TOBACCO NON-USER: CPT | Performed by: STUDENT IN AN ORGANIZED HEALTH CARE EDUCATION/TRAINING PROGRAM

## 2024-10-14 PROCEDURE — 3008F BODY MASS INDEX DOCD: CPT | Performed by: STUDENT IN AN ORGANIZED HEALTH CARE EDUCATION/TRAINING PROGRAM

## 2024-10-14 PROCEDURE — 99214 OFFICE O/P EST MOD 30 MIN: CPT | Performed by: STUDENT IN AN ORGANIZED HEALTH CARE EDUCATION/TRAINING PROGRAM

## 2024-10-14 RX ORDER — NEBULIZER AND COMPRESSOR
1 EACH MISCELLANEOUS ONCE
Qty: 1 EACH | Refills: 0 | Status: SHIPPED | OUTPATIENT
Start: 2024-10-14 | End: 2024-10-14

## 2024-10-14 ASSESSMENT — ENCOUNTER SYMPTOMS
DIARRHEA: 0
NAUSEA: 0
CONSTIPATION: 0
ARTHRALGIAS: 1
RHINORRHEA: 0
ABDOMINAL PAIN: 0
VOMITING: 0
FATIGUE: 0
DIZZINESS: 0
LIGHT-HEADEDNESS: 0
FEVER: 0
COUGH: 0
SHORTNESS OF BREATH: 0
MYALGIAS: 1
HEADACHES: 0
CHILLS: 0

## 2024-10-14 ASSESSMENT — PATIENT HEALTH QUESTIONNAIRE - PHQ9
SUM OF ALL RESPONSES TO PHQ9 QUESTIONS 1 AND 2: 0
2. FEELING DOWN, DEPRESSED OR HOPELESS: NOT AT ALL
1. LITTLE INTEREST OR PLEASURE IN DOING THINGS: NOT AT ALL

## 2024-10-14 NOTE — ASSESSMENT & PLAN NOTE
Present since around 2021. Diagnosed on x-rays with a previous provider. Was told that she has arthritis. KERLINE testing and rheumatoid factor testing negative.

## 2024-10-14 NOTE — PATIENT INSTRUCTIONS
Thank you for coming in and getting established with us in the office.    I did go ahead and try to update some of your medical history within your chart.  We can always make additions down the line in the future.    As we discussed, it looks like you have already been seen for a wellness examination earlier in October.  It looks like there was some lab work orders placed and I also went on and added some of my own lab work orders to what was already ordered.  You can follow-up at any [facility] laboratory to have these labs performed.  I do recommend getting the labs done fasting.  I recommend fasting for about 10 hours beforehand.  Please make sure that you hydrate before getting the lab work performed.    I recommend continued regular follow-up with your rheumatologist as well as current medications.    Please call for any additional medications that we need to fill for you in the future.    I did also go ahead and submit for an order to get established with an OB/GYN closer to where you live in [place].  This can be done through [place] [facility] and we will go ahead and fax over a referral to their office.  Please let us know if you do not hear from their office in the next 1 to 2 weeks about getting set up for an appointment for regular care.    Please keep track of your blood pressures at home as it was a little bit elevated today in the office.  It also looks like this has happened in the past.  Please keep a log of her blood pressures and bring that with you to your next appointment.  Please also bring your blood pressure cuff with you to that appointment so that we can check it against our manual cuffs in the office to make sure that it is accurate.    He will plan on follow-up for the blood pressure in the next 6 to 8 weeks.  Please get this appointment set up before you leave here today.    Thank you

## 2024-10-14 NOTE — ASSESSMENT & PLAN NOTE
Following with Rheumatology with Dr. Marie. Currently on Alendronate weekly. Last DEXA scan 12/2023.

## 2024-10-14 NOTE — ASSESSMENT & PLAN NOTE
Symptomatic since age 13. 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 Does get frequent pain when on her feet for extended periods. Has also previously tried on cortisone shots as well.

## 2024-10-14 NOTE — PROGRESS NOTES
"Subjective   Patient ID: Nazanin Junior is a 29 y.o. female who presents for \[facility]\"" Care.    HPI     She is here to \[facility]\"" care.  She was being seen closer in [place] with . She moved and would like to get establish closer.  She is up to date with her wellness care. Last seen for annual physical on 10/2/2024.  Her last provider did order some follow up blood tests but she did not get this done yet.    She does have a lot of bone and joint problems.  She is also following with rheumatology for low bone density.  She last had a DEXA scan in 12/2023.  She is managed with alendronate once weekly. Next planned for a DEXA scan in December 2025.  They will follow up and change the treatment approach depending on response.    She was previously following with a rheumatologist with the [facility].  Previously diagnosed with arthritis in the knees.  She she is active for extended periods of time she gets pain in her knees and then sometimes in her ankles.      Review of Systems   Constitutional:  Negative for chills, fatigue and fever.   HENT:  Negative for congestion and rhinorrhea.    Respiratory:  Negative for cough and shortness of breath.    Cardiovascular:  Negative for chest pain.   Gastrointestinal:  Negative for abdominal pain, constipation, diarrhea, nausea and vomiting.   Musculoskeletal:  Positive for arthralgias (chronic) and myalgias (chronic).   Neurological:  Negative for dizziness, light-headedness and headaches.       Objective   /86   Pulse 84   Temp 36.6 °C (97.9 °F)   Ht 1.638 m (5' 4.5\")   Wt 91.2 kg (201 lb)   SpO2 98%   BMI 33.97 kg/m²     Physical Exam  Vitals and nursing note reviewed.   Constitutional:       General: She is not in acute distress.     Appearance: Normal appearance. She is not ill-appearing or toxic-appearing.   HENT:      Head: Normocephalic and atraumatic.   Cardiovascular:      Rate and Rhythm: Normal rate and regular rhythm.      Heart sounds: Normal " heart sounds.   Pulmonary:      Effort: Pulmonary effort is normal.      Breath sounds: Normal breath sounds.   Neurological:      Mental Status: She is alert.         Assessment/Plan   Problem List Items Addressed This Visit             ICD-10-CM    Osteopenia determined by x-ray M85.80     Following with Rheumatology with Dr. Marie. Currently on Alendronate weekly. Last DEXA scan 12/2023.         Hypermobility of joint M24.9     Chronic. Stable. Mostly in the knees and the hands. She has had this since she was younger.         Major depressive disorder in partial remission (CMS-HCC) F32.4     Chronic. Stable. Well controlled on escitalopram daily. Also in regular therapy.         Depression with anxiety F41.8     Chronic. Stable. Well controlled on escitalopram daily. Also in regular therapy.         X-ray evidence of poor bone mineralization R93.7     Following with Rheumatology with Dr. Marie. Currently on Alendronate weekly. Last DEXA scan 12/2023.         Chronic pain of both knees M25.561, M25.562, G89.29     Present since around 2021. Diagnosed on x-rays with a previous provider. Was told that she has arthritis. KERLINE testing and rheumatoid factor testing negative.         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 Does get frequent pain when on her feet for extended periods. Has also previously tried on cortisone shots as well.          Other Visit Diagnoses         Codes    Encounter to establish care    -  Primary Z76.89    Relevant Orders    Referral to Obstetrics / Gynecology    Hemoglobin A1C    Healthcare maintenance     Z00.00    Relevant Orders    TSH with reflex to Free T4 if abnormal    Hemoglobin A1C    Elevated blood pressure reading     R03.0    Relevant Medications    miscellaneous medical supply (Blood Pressure Cuff) misc          History and physical examination as above.  Patient presenting to establish care.  Patient  up-to-date on current wellness examination.  Orders for lab work placed for the patient we will follow-up on those results.  Did go ahead and update medical history within the patient's chart.  Blood pressure reading initially elevated.  Patient will keep track of blood pressures at home and bring a log of pressures and her blood pressure cuff with her to her next appointment.  Order for blood pressure cuff placed for the patient to take with her to the pharmacy.  Referral given for OB/GYN within [facility].  She will contact us back in the next 1 to 2 weeks if she does not hear from them about getting set up for an appointment to get established.  We will otherwise continue with regular wellness care.  Plan for follow-up for the blood pressure in the next 6 to 8 weeks.  She will call sooner with other acute concerns or complaints.

## 2024-10-19 ENCOUNTER — LAB (OUTPATIENT)
Dept: LAB | Facility: LAB | Age: 29
End: 2024-10-19
Payer: COMMERCIAL

## 2024-10-19 DIAGNOSIS — Z51.81 MEDICATION MONITORING ENCOUNTER: ICD-10-CM

## 2024-10-19 DIAGNOSIS — Z76.89 ENCOUNTER TO ESTABLISH CARE: ICD-10-CM

## 2024-10-19 DIAGNOSIS — E78.5 ELEVATED LIPIDS: ICD-10-CM

## 2024-10-19 DIAGNOSIS — E55.9 VITAMIN D DEFICIENCY: ICD-10-CM

## 2024-10-19 DIAGNOSIS — M85.80 OSTEOPENIA, UNSPECIFIED LOCATION: ICD-10-CM

## 2024-10-19 DIAGNOSIS — M85.80 OSTEOPENIA DETERMINED BY X-RAY: ICD-10-CM

## 2024-10-19 DIAGNOSIS — Z00.00 HEALTHCARE MAINTENANCE: ICD-10-CM

## 2024-10-19 DIAGNOSIS — Z00.00 ROUTINE ADULT HEALTH MAINTENANCE: ICD-10-CM

## 2024-10-19 PROCEDURE — 80061 LIPID PANEL: CPT

## 2024-10-19 PROCEDURE — 85025 COMPLETE CBC W/AUTO DIFF WBC: CPT

## 2024-10-19 PROCEDURE — 82306 VITAMIN D 25 HYDROXY: CPT

## 2024-10-19 PROCEDURE — 36415 COLL VENOUS BLD VENIPUNCTURE: CPT

## 2024-10-19 PROCEDURE — 84443 ASSAY THYROID STIM HORMONE: CPT

## 2024-10-19 PROCEDURE — 83036 HEMOGLOBIN GLYCOSYLATED A1C: CPT

## 2024-10-19 PROCEDURE — 80053 COMPREHEN METABOLIC PANEL: CPT

## 2024-10-20 LAB
25(OH)D3 SERPL-MCNC: 104 NG/ML (ref 30–100)
ALBUMIN SERPL BCP-MCNC: 4.3 G/DL (ref 3.4–5)
ALP SERPL-CCNC: 72 U/L (ref 33–110)
ALT SERPL W P-5'-P-CCNC: 16 U/L (ref 7–45)
ANION GAP SERPL CALC-SCNC: 13 MMOL/L (ref 10–20)
AST SERPL W P-5'-P-CCNC: 14 U/L (ref 9–39)
BASOPHILS # BLD AUTO: 0.08 X10*3/UL (ref 0–0.1)
BASOPHILS NFR BLD AUTO: 0.7 %
BILIRUB SERPL-MCNC: 0.3 MG/DL (ref 0–1.2)
BUN SERPL-MCNC: 12 MG/DL (ref 6–23)
CALCIUM SERPL-MCNC: 10 MG/DL (ref 8.6–10.6)
CHLORIDE SERPL-SCNC: 101 MMOL/L (ref 98–107)
CHOLEST SERPL-MCNC: 199 MG/DL (ref 0–199)
CHOLESTEROL/HDL RATIO: 3.9
CO2 SERPL-SCNC: 27 MMOL/L (ref 21–32)
CREAT SERPL-MCNC: 0.86 MG/DL (ref 0.5–1.05)
EGFRCR SERPLBLD CKD-EPI 2021: >90 ML/MIN/1.73M*2
EOSINOPHIL # BLD AUTO: 0.25 X10*3/UL (ref 0–0.7)
EOSINOPHIL NFR BLD AUTO: 2.2 %
ERYTHROCYTE [DISTWIDTH] IN BLOOD BY AUTOMATED COUNT: 12.9 % (ref 11.5–14.5)
EST. AVERAGE GLUCOSE BLD GHB EST-MCNC: 103 MG/DL
GLUCOSE SERPL-MCNC: 82 MG/DL (ref 74–99)
HBA1C MFR BLD: 5.2 %
HCT VFR BLD AUTO: 43.1 % (ref 36–46)
HDLC SERPL-MCNC: 51.6 MG/DL
HGB BLD-MCNC: 14.3 G/DL (ref 12–16)
IMM GRANULOCYTES # BLD AUTO: 0.02 X10*3/UL (ref 0–0.7)
IMM GRANULOCYTES NFR BLD AUTO: 0.2 % (ref 0–0.9)
LDLC SERPL CALC-MCNC: 118 MG/DL
LYMPHOCYTES # BLD AUTO: 3.47 X10*3/UL (ref 1.2–4.8)
LYMPHOCYTES NFR BLD AUTO: 30.9 %
MCH RBC QN AUTO: 29.5 PG (ref 26–34)
MCHC RBC AUTO-ENTMCNC: 33.2 G/DL (ref 32–36)
MCV RBC AUTO: 89 FL (ref 80–100)
MONOCYTES # BLD AUTO: 0.69 X10*3/UL (ref 0.1–1)
MONOCYTES NFR BLD AUTO: 6.1 %
NEUTROPHILS # BLD AUTO: 6.71 X10*3/UL (ref 1.2–7.7)
NEUTROPHILS NFR BLD AUTO: 59.9 %
NON HDL CHOLESTEROL: 147 MG/DL (ref 0–149)
NRBC BLD-RTO: 0 /100 WBCS (ref 0–0)
PLATELET # BLD AUTO: 486 X10*3/UL (ref 150–450)
POTASSIUM SERPL-SCNC: 4.3 MMOL/L (ref 3.5–5.3)
PROT SERPL-MCNC: 7.4 G/DL (ref 6.4–8.2)
RBC # BLD AUTO: 4.84 X10*6/UL (ref 4–5.2)
SODIUM SERPL-SCNC: 137 MMOL/L (ref 136–145)
TRIGL SERPL-MCNC: 148 MG/DL (ref 0–149)
TSH SERPL-ACNC: 2.1 MIU/L (ref 0.44–3.98)
VLDL: 30 MG/DL (ref 0–40)
WBC # BLD AUTO: 11.2 X10*3/UL (ref 4.4–11.3)

## 2024-12-02 ENCOUNTER — APPOINTMENT (OUTPATIENT)
Dept: PRIMARY CARE | Facility: CLINIC | Age: 29
End: 2024-12-02
Payer: COMMERCIAL

## 2024-12-31 ENCOUNTER — TELEPHONE (OUTPATIENT)
Dept: RHEUMATOLOGY | Facility: CLINIC | Age: 29
End: 2024-12-31
Payer: COMMERCIAL

## 2025-01-03 ENCOUNTER — APPOINTMENT (OUTPATIENT)
Dept: PRIMARY CARE | Facility: CLINIC | Age: 30
End: 2025-01-03
Payer: COMMERCIAL

## 2025-01-13 ENCOUNTER — APPOINTMENT (OUTPATIENT)
Dept: PRIMARY CARE | Facility: CLINIC | Age: 30
End: 2025-01-13
Payer: COMMERCIAL

## 2025-01-13 VITALS
DIASTOLIC BLOOD PRESSURE: 94 MMHG | WEIGHT: 216 LBS | SYSTOLIC BLOOD PRESSURE: 126 MMHG | OXYGEN SATURATION: 98 % | BODY MASS INDEX: 36.5 KG/M2 | TEMPERATURE: 96.9 F | HEART RATE: 91 BPM

## 2025-01-13 DIAGNOSIS — R03.0 ELEVATED BLOOD PRESSURE READING: Primary | ICD-10-CM

## 2025-01-13 DIAGNOSIS — E67.3 HYPERVITAMINOSIS D: ICD-10-CM

## 2025-01-13 DIAGNOSIS — R41.840 INATTENTION: ICD-10-CM

## 2025-01-13 PROCEDURE — 99214 OFFICE O/P EST MOD 30 MIN: CPT | Performed by: STUDENT IN AN ORGANIZED HEALTH CARE EDUCATION/TRAINING PROGRAM

## 2025-01-13 ASSESSMENT — ENCOUNTER SYMPTOMS
FEVER: 0
ABDOMINAL PAIN: 0
FATIGUE: 0
SHORTNESS OF BREATH: 0
CHILLS: 0
COUGH: 0

## 2025-01-13 ASSESSMENT — PATIENT HEALTH QUESTIONNAIRE - PHQ9
2. FEELING DOWN, DEPRESSED OR HOPELESS: NOT AT ALL
SUM OF ALL RESPONSES TO PHQ9 QUESTIONS 1 AND 2: 0
1. LITTLE INTEREST OR PLEASURE IN DOING THINGS: NOT AT ALL

## 2025-01-13 NOTE — PATIENT INSTRUCTIONS
Thank you for coming in for your follow-up.    Overall your home blood pressure cuff looks like it is fairly accurate.  I would like you to keep blood pressure readings over the next month at least a couple times a week but no more than 3 times a day.  Please keep a log of your pressures.  Please send a message with the average of your pressures after this timeframe.  We can then determine if we need any sort of medical management.  This would especially be the case if the diastolic pressure remains mostly in the 90s.  If so, we would probably plan on starting her on a low-dose of hydrochlorothiazide.    I would like you to stop taking your weekly vitamin D supplements.  Please start taking daily vitamin D supplementation of about 4000 units/day.  Please make sure that this is taken with a full meal so that it can be properly absorbed.  We will recheck the vitamin D in about 2 months.  Your last vitamin D level has been elevated so would like to decrease the amount that you are taking from your previous provider.    With some of your symptoms primarily lack of focus and inattention that are been ongoing for some time, we will go ahead and refer you to psychiatry for ADHD testing.  Please call in the next week or so if you do not hear from them about getting set up for an appointment.  After you are tested, we can plan on a follow-up in the office to determine next steps if they do recommend medication management.    Thank you

## 2025-01-13 NOTE — PROGRESS NOTES
Subjective   Patient ID: Nazanin Junior is a 29 y.o. female who presents for Follow-up.    HPI     She was able to check her blood pressure at home but only a few times.  Average of 118/93.  She did bring her blood pressure cuff with her to today's appointment.    She is interested in getting tested for and getting treated for ADHD.  She does have symptoms primarily of lack of focus and inattention that have been ongoing for a number of years.  She has not had testing previously.  She has never been on medications in the past.    Review of Systems   Constitutional:  Negative for chills, fatigue and fever.   HENT:  Negative for congestion.    Respiratory:  Negative for cough and shortness of breath.    Cardiovascular:  Negative for chest pain.   Gastrointestinal:  Negative for abdominal pain.   Psychiatric/Behavioral:  Positive for decreased concentration. Negative for dysphoric mood. The patient is hyperactive. The patient is not nervous/anxious.        Objective   BP (!) 126/94   Pulse 91   Temp 36.1 °C (96.9 °F)   Wt 98 kg (216 lb)   SpO2 98%   BMI 36.50 kg/m²     Physical Exam  Vitals and nursing note reviewed.   Constitutional:       General: She is not in acute distress.     Appearance: Normal appearance. She is normal weight. She is not ill-appearing or toxic-appearing.   HENT:      Head: Normocephalic and atraumatic.   Cardiovascular:      Rate and Rhythm: Normal rate and regular rhythm.      Heart sounds: Normal heart sounds.   Pulmonary:      Effort: Pulmonary effort is normal.      Breath sounds: Normal breath sounds.   Neurological:      Mental Status: She is alert.         Assessment/Plan   Problem List Items Addressed This Visit    None  Visit Diagnoses         Codes    Elevated blood pressure reading    -  Primary R03.0    Hypervitaminosis D     E67.3    Relevant Orders    Vitamin D 25-Hydroxy,Total (for eval of Vitamin D levels)    Inattention     R41.840    Relevant Orders    Referral to  Psychiatry          History and physical examination as above.  Patient coming in for follow-up.  Did go ahead and measure patient's blood pressure cuff in the office which overall looks fairly accurate.  We did discuss for her to get more readings of her pressures at home.  Her diastolic pressure has been in the 90s and it looks like it has been around that range at home but she did not get enough readings.  She will continue to keep track at home and let us know if this remains elevated.  May consider placing her on a blood pressure medication such as hydrochlorothiazide.  Patient will stop her weekly vitamin D supplementation and will start taking a lower dose of daily supplement.  Plan for rechecking the vitamin D level in about 2 months.  Did go ahead and submit a referral to psychiatry for testing for ADHD.  We will plan on following up on the results.  She will call sooner with other acute concerns or complaints.

## 2025-01-20 ASSESSMENT — ENCOUNTER SYMPTOMS
DYSPHORIC MOOD: 0
DECREASED CONCENTRATION: 1
NERVOUS/ANXIOUS: 0
HYPERACTIVE: 1

## 2025-02-04 ENCOUNTER — APPOINTMENT (OUTPATIENT)
Dept: OBSTETRICS AND GYNECOLOGY | Facility: CLINIC | Age: 30
End: 2025-02-04
Payer: COMMERCIAL

## 2025-02-04 VITALS
SYSTOLIC BLOOD PRESSURE: 154 MMHG | WEIGHT: 214.2 LBS | HEIGHT: 65 IN | BODY MASS INDEX: 35.69 KG/M2 | DIASTOLIC BLOOD PRESSURE: 101 MMHG

## 2025-02-04 DIAGNOSIS — Z01.419 PAP SMEAR, AS PART OF ROUTINE GYNECOLOGICAL EXAMINATION: ICD-10-CM

## 2025-02-04 DIAGNOSIS — Z76.89 ENCOUNTER TO ESTABLISH CARE: ICD-10-CM

## 2025-02-04 DIAGNOSIS — Z01.419 ENCOUNTER FOR ANNUAL ROUTINE GYNECOLOGICAL EXAMINATION: Primary | ICD-10-CM

## 2025-02-04 DIAGNOSIS — Z11.3 SCREEN FOR STD (SEXUALLY TRANSMITTED DISEASE): ICD-10-CM

## 2025-02-04 PROCEDURE — 1036F TOBACCO NON-USER: CPT | Performed by: OBSTETRICS & GYNECOLOGY

## 2025-02-04 PROCEDURE — 3008F BODY MASS INDEX DOCD: CPT | Performed by: OBSTETRICS & GYNECOLOGY

## 2025-02-04 PROCEDURE — 99385 PREV VISIT NEW AGE 18-39: CPT | Performed by: OBSTETRICS & GYNECOLOGY

## 2025-02-04 PROCEDURE — 87591 N.GONORRHOEAE DNA AMP PROB: CPT

## 2025-02-04 PROCEDURE — 87661 TRICHOMONAS VAGINALIS AMPLIF: CPT

## 2025-02-04 PROCEDURE — 87491 CHLMYD TRACH DNA AMP PROBE: CPT

## 2025-02-04 NOTE — PROGRESS NOTES
Subjective   Patient ID: Nazanin Junior is a 29 y.o. female who presents for Annual Exam (Patient here for annual--wants to discuss going off ocp due to weight gain and acne/Contraception-ocp/Pt would like std screening-no sx/Declined chaperone).  HPI  Patient is a 29-year-old  0 para 0 whose last menstrual period was 2025 she said they are typically 1 month apart she will bleed anywhere from 3 to 4 days currently uses Junel for cycle control patient is a lesbian.  She admits to regular bowel movements denies any urinary symptoms.  Medical history significant for borderline hypertension currently being evaluated by primary care.  Anxiety and depression.  Surgical history significant for benign left breast biopsy.  She denies cigarette smoking, vaping or drug use.  Currently works at home as an .  Family history negative for breast pelvic or colon cancer.  Patient states remote history of abnormal Pap no surgery required.  Denies history of ovarian cyst, fibroids, endometriosis or pelvic infections.  Review of Systems    Objective   Physical Exam  Gen.: Alert and in no acute distress. Well-developed, well-nourished.  Thyroid: Nonenlarged and no palpable thyroid nodules  Cardiovascular: Heart regular rate and rhythm  Pulmonary: Clear bilateral breath sounds  Breasts: Normal appearance, no nipple discharge and no skin changes. No palpable masses and no axillary lymphadenopathy  Abdomen: Soft and nontender, no abdominal mass palpated, no organomegaly   Pelvic: External genitalia normal, Bartholin's urethral and Redding's glands normal. Vagina normal. Cervix normal. Uterus normal size and shape. Right adnexa normal without masses. Left adnexa normal without masses. Perianal area and normal.  Assessment/Plan   Annual GYN exam, Pap and reflex HPV done, STD screening per patient request.  She and I discussed birth control pills at length.  She would like to stop them for now.  Recommend she  complete a pack.  If periods become irregular patient may call for other options.         Devin Soria MD 02/04/25 11:57 AM

## 2025-02-06 LAB
C TRACH RRNA SPEC QL NAA+PROBE: NEGATIVE
N GONORRHOEA DNA SPEC QL PROBE+SIG AMP: NEGATIVE
T VAGINALIS RRNA SPEC QL NAA+PROBE: NEGATIVE

## 2025-02-07 LAB
HBV SURFACE AG SERPL QL IA: NORMAL
HCV AB SERPL QL IA: NORMAL
HIV 1+2 AB+HIV1 P24 AG SERPL QL IA: NORMAL
T PALLIDUM AB SER QL IA: NEGATIVE

## 2025-02-18 LAB
CYTOLOGY CMNT CVX/VAG CYTO-IMP: NORMAL
LAB AP HPV GENOTYPE QUESTION: NO
LAB AP HPV HR: NORMAL
LAB AP PAP ADDITIONAL TESTS: NORMAL
LABORATORY COMMENT REPORT: NORMAL
LMP START DATE: NORMAL
PATH REPORT.TOTAL CANCER: NORMAL

## 2025-06-16 ENCOUNTER — APPOINTMENT (OUTPATIENT)
Dept: RHEUMATOLOGY | Facility: CLINIC | Age: 30
End: 2025-06-16
Payer: COMMERCIAL

## 2025-10-03 ENCOUNTER — APPOINTMENT (OUTPATIENT)
Dept: PRIMARY CARE | Facility: CLINIC | Age: 30
End: 2025-10-03
Payer: COMMERCIAL

## 2025-10-06 ENCOUNTER — APPOINTMENT (OUTPATIENT)
Dept: RHEUMATOLOGY | Facility: CLINIC | Age: 30
End: 2025-10-06
Payer: COMMERCIAL